# Patient Record
Sex: MALE | Race: WHITE | Employment: FULL TIME | URBAN - METROPOLITAN AREA
[De-identification: names, ages, dates, MRNs, and addresses within clinical notes are randomized per-mention and may not be internally consistent; named-entity substitution may affect disease eponyms.]

---

## 2017-06-04 ENCOUNTER — APPOINTMENT (OUTPATIENT)
Dept: CT IMAGING | Facility: CLINIC | Age: 37
End: 2017-06-04
Attending: EMERGENCY MEDICINE
Payer: COMMERCIAL

## 2017-06-04 ENCOUNTER — HOSPITAL ENCOUNTER (EMERGENCY)
Facility: CLINIC | Age: 37
Discharge: HOME | End: 2017-06-04
Attending: EMERGENCY MEDICINE | Admitting: EMERGENCY MEDICINE
Payer: COMMERCIAL

## 2017-06-04 VITALS
DIASTOLIC BLOOD PRESSURE: 65 MMHG | RESPIRATION RATE: 18 BRPM | WEIGHT: 285 LBS | SYSTOLIC BLOOD PRESSURE: 115 MMHG | HEART RATE: 102 BPM | BODY MASS INDEX: 36.57 KG/M2 | HEIGHT: 74 IN | OXYGEN SATURATION: 97 % | TEMPERATURE: 98.1 F

## 2017-06-04 DIAGNOSIS — G44.219 EPISODIC TENSION-TYPE HEADACHE, NOT INTRACTABLE: ICD-10-CM

## 2017-06-04 PROCEDURE — 72125 CT NECK SPINE W/O DYE: CPT

## 2017-06-04 PROCEDURE — 25000125 ZZHC RX 250: Performed by: EMERGENCY MEDICINE

## 2017-06-04 PROCEDURE — 99285 EMERGENCY DEPT VISIT HI MDM: CPT | Mod: 25 | Performed by: EMERGENCY MEDICINE

## 2017-06-04 PROCEDURE — 25000132 ZZH RX MED GY IP 250 OP 250 PS 637: Performed by: EMERGENCY MEDICINE

## 2017-06-04 PROCEDURE — 70450 CT HEAD/BRAIN W/O DYE: CPT

## 2017-06-04 PROCEDURE — 25000128 H RX IP 250 OP 636: Performed by: EMERGENCY MEDICINE

## 2017-06-04 PROCEDURE — 96374 THER/PROPH/DIAG INJ IV PUSH: CPT | Performed by: EMERGENCY MEDICINE

## 2017-06-04 PROCEDURE — 96361 HYDRATE IV INFUSION ADD-ON: CPT | Performed by: EMERGENCY MEDICINE

## 2017-06-04 PROCEDURE — 99284 EMERGENCY DEPT VISIT MOD MDM: CPT | Mod: Z6 | Performed by: EMERGENCY MEDICINE

## 2017-06-04 PROCEDURE — 96375 TX/PRO/DX INJ NEW DRUG ADDON: CPT | Performed by: EMERGENCY MEDICINE

## 2017-06-04 RX ORDER — DIAZEPAM 5 MG
5 TABLET ORAL ONCE
Status: COMPLETED | OUTPATIENT
Start: 2017-06-04 | End: 2017-06-04

## 2017-06-04 RX ORDER — SODIUM CHLORIDE 9 MG/ML
1000 INJECTION, SOLUTION INTRAVENOUS CONTINUOUS
Status: DISCONTINUED | OUTPATIENT
Start: 2017-06-04 | End: 2017-06-04 | Stop reason: HOSPADM

## 2017-06-04 RX ORDER — METOCLOPRAMIDE HYDROCHLORIDE 5 MG/ML
5 INJECTION INTRAMUSCULAR; INTRAVENOUS ONCE
Status: DISCONTINUED | OUTPATIENT
Start: 2017-06-04 | End: 2017-06-04 | Stop reason: HOSPADM

## 2017-06-04 RX ORDER — HYDROMORPHONE HYDROCHLORIDE 1 MG/ML
0.5 INJECTION, SOLUTION INTRAMUSCULAR; INTRAVENOUS; SUBCUTANEOUS
Status: DISCONTINUED | OUTPATIENT
Start: 2017-06-04 | End: 2017-06-04

## 2017-06-04 RX ORDER — ACETAMINOPHEN 325 MG/1
975 TABLET ORAL ONCE
Status: COMPLETED | OUTPATIENT
Start: 2017-06-04 | End: 2017-06-04

## 2017-06-04 RX ORDER — KETOROLAC TROMETHAMINE 30 MG/ML
30 INJECTION, SOLUTION INTRAMUSCULAR; INTRAVENOUS ONCE
Status: COMPLETED | OUTPATIENT
Start: 2017-06-04 | End: 2017-06-04

## 2017-06-04 RX ADMIN — VALPROATE SODIUM 500 MG: 100 INJECTION, SOLUTION INTRAVENOUS at 16:06

## 2017-06-04 RX ADMIN — SODIUM CHLORIDE 1000 ML: 9 INJECTION, SOLUTION INTRAVENOUS at 14:31

## 2017-06-04 RX ADMIN — ACETAMINOPHEN 975 MG: 325 TABLET, FILM COATED ORAL at 14:31

## 2017-06-04 RX ADMIN — SODIUM CHLORIDE 1000 ML: 9 INJECTION, SOLUTION INTRAVENOUS at 13:33

## 2017-06-04 RX ADMIN — KETOROLAC TROMETHAMINE 30 MG: 30 INJECTION, SOLUTION INTRAMUSCULAR at 13:36

## 2017-06-04 RX ADMIN — DIAZEPAM 5 MG: 5 TABLET ORAL at 14:31

## 2017-06-04 RX ADMIN — PROCHLORPERAZINE EDISYLATE 5 MG: 5 INJECTION INTRAMUSCULAR; INTRAVENOUS at 13:33

## 2017-06-04 ASSESSMENT — ENCOUNTER SYMPTOMS
RHINORRHEA: 0
NAUSEA: 1
WEAKNESS: 0
NUMBNESS: 0
PHOTOPHOBIA: 1
FEVER: 0
COUGH: 0
ABDOMINAL PAIN: 0
VOMITING: 0
HEADACHES: 1

## 2017-06-04 NOTE — ED PROVIDER NOTES
I was asked to take over the care of Mr. Pichardo from Dr. Ram at 330pm with plan for follow up on CT scan of the head and neck.  I have reviewed the Radiology reports with no sign of acute pathology.  The patient has had some improvement in symptoms of headache in the ED and has remained hemodynamically stable with no Neurologic deficit.  He has again stated other providers have given him Dilaudid for his headaches and I spoke at length why I do not believe narcotics are an optimal practice for treatment of his headaches.  He was dismissed to home in stable condition with recommendation to follow up with his PCP and Neurologist for assistance with long term headaches.     Natan Mosqueda MD  06/04/17 5060

## 2017-06-04 NOTE — ED PROVIDER NOTES
History     Chief Complaint   Patient presents with     Headache     Since Friday - Migraine     HPI  Dru Pichardo is a 36 year old male with a history of seizures, epilepsy, synostosis, headaches and Arnold-Chiari I malformation, status-post decompression (10/1/15) who presents with headache. The patient reports that he developed a headache on Friday, 2 days ago, after work that was gradual in onset and has continued to worsen since then. The headache is located in the occipital region radiating forward on his head. He complains of associated photophobia, blurred vision and nausea. He denies fever, double vision, vomiting, focal numbness or weakness, neck pain, rash or gait problem. He reports he did have a fall about 1 month ago and his headaches have been gradually worsening since then. He complains of neck stiffness.  He notes that he has had similar headaches in the past. At home, he has been taking Imitrex, Tylenol and ibuprofen without relief. He denies any cold or flu-like symptoms. No recent seizure activity.      Past Medical History:   Diagnosis Date     Arnold-Chiari malformation, type I (H) 8/3/2015     BMI 32.0-32.9,adult 9/24/2015     Deviated nasal septum 9/24/2015     Epilepsy (H)      History of frequent headaches      Seizures (H)      Snores 9/24/2015     Synostosis (cranial) 8/3/2015     Tobacco use 9/24/2015       Past Surgical History:   Procedure Laterality Date     DECOMPRESSION CHIARI N/A 10/1/2015    Procedure: DECOMPRESSION CHIARI;  Surgeon: Bethel Cunningham MD;  Location: [facility] OR     GI SURGERY      hemorrhoidectomy     HC TOOTH EXTRACTION W/FORCEP       ORTHOPEDIC SURGERY Right     shoulder       Family History   Problem Relation Age of Onset     DIABETES Father        Social History   Substance Use Topics     Smoking status: Former Smoker     Years: 14.00     Quit date: 12/25/2015     Smokeless tobacco: Not on file     Alcohol use No     Current Facility-Administered  "Medications   Medication     0.9% sodium chloride BOLUS    Followed by     0.9% sodium chloride infusion     prochlorperazine (COMPAZINE) injection 5 mg     ketorolac (TORADOL) injection 30 mg     Current Outpatient Prescriptions   Medication     SUMAtriptan Succinate (IMITREX PO)     carBAMazepine (TEGRETOL XR) 200 MG 12 hr tablet     ValACYclovir HCl (VALTREX PO)     clomiPHENE (CLOMID) 50 MG tablet        Allergies   Allergen Reactions     Cats      Seasonal Allergies       I have reviewed the Medications, Allergies, Past Medical and Surgical History, and Social History in the Epic system.    Review of Systems   Constitutional: Negative for fever.   HENT: Negative for congestion and rhinorrhea.    Eyes: Positive for photophobia and visual disturbance (burred).   Respiratory: Negative for cough.    Gastrointestinal: Positive for nausea. Negative for abdominal pain and vomiting.   Musculoskeletal: Negative for gait problem.   Skin: Negative for rash.   Neurological: Positive for headaches. Negative for weakness and numbness.   All other systems reviewed and are negative.      Physical Exam   BP: 137/87  Pulse: 102  Heart Rate: 102  Temp: 98.1  F (36.7  C)  Resp: 18  Height: 188 cm (6' 2\")  Weight: 129.3 kg (285 lb)  SpO2: 96 %  Physical Exam  General: patient is alert and oriented and in no acute distress   Head: atraumatic and normocephalic   EENT: moist mucus membranes without tonsillar erythema or exudates, pupils 3mm equal round and reactive, EOMI, no nystagmus  Neck: supple with full ROM, no meningismus  Cardiovascular: regular rate and rhythm, extremities warm and well perfused, no lower extremity edema  Pulmonary: lungs clear to auscultation bilaterally   Abdomen: soft, non-tender   Musculoskeletal: normal range of motion   Neurological: alert and oriented, moving all extremities symmetrically, CN II-XII intact, strength 5/5 and symmetric in , elbow flexion/extension, hip flexion/extension, knee " flexion/extension and ankle plantar/dorsiflexion, sensation to light touch in distal upper and lower extremities intact, normal finger to nose bilaterally  Skin: warm, dry, no rashes appreciated    ED Course     ED Course     Procedures     1:14 PM  The patient was seen and examined by Dr. Ram in Room 11.               Critical Care time:  none               Labs Ordered and Resulted from Time of ED Arrival Up to the Time of Departure from the ED - No data to display         Assessments & Plan (with Medical Decision Making)   Mr. Pichardo is a 36 year old male with a history of seizures, epilepsy, synostosis, headaches and Arnold-Chiari I malformation, status-post decompression (10/1/15) who presents with headache.  He is afebrile and does not have any infectious symptoms concerning for meningitis.  He denies any recent falls or trauma.  He reports relatively gradual onset of his symptoms without sudden onset or findings concerning for subarachnoid hemorrhage.  He does not have any focal deficits to suggest CVA.  He did have a fall about one month ago with worsening headaches and will obtain CT to rule out evidence of trauma or increased ICP.  He does report similar episodes of headache similar to his current episode.  He was given IVF, ketorolac and compazine with some improvement though still persistent.  Given acetaminophen, reglan and diazepam for neck stiffness and muscle tightness and continues to have ongoing pain.  Valproate ordered to try for next step of treatment.  Patient reports he usually gets dilaudid for his headache and that helps.  We did discuss that narcotics are not indicated for migraine treatment.  Patient signed out to evening provider pending CT.  Anticipate he will be discharge to home if imaging is unremarkable.      I have reviewed the nursing notes.    I have reviewed the findings, diagnosis, plan and need for follow up with the patient.    New Prescriptions    No medications on file        Final diagnoses:   Episodic tension-type headache, not intractable     I, Gil Abbasi, am serving as a trained medical scribe to document services personally performed by Jazlyn Ram MD, based on the provider's statements to me.   I, Jazlyn Ram MD, was physically present and have reviewed and verified the accuracy of this note documented by Gil Abbasi.     6/4/2017   [facility], [place], EMERGENCY DEPARTMENT     Jazlyn Ram MD  06/04/17 1534

## 2017-06-04 NOTE — ED AVS SNAPSHOT
[facility], Emergency Department    [address] [place] [address]    Phone:  [phone]                                       Dru Pichardo   MRN: [id number]    Department:  [facility], [place], Emergency Department   Date of Visit:  6/4/2017           Patient Information     Date Of Birth          1980        Your diagnoses for this visit were:     Episodic tension-type headache, not intractable        You were seen by Jazlyn Ram MD.      Follow-up Information     Follow up with Neurology, [facility].    Contact information:    [address]  [facility] [address]  [phone]          Discharge Instructions         Managing Tension-type Headache Symptoms  A tension-type headache can develop slowly. Being aware of the symptoms helps you recognize a headache early. Then you can act to reduce pain and relieve tension. Methods for relieving your symptoms include self-care and medicine.    Tension-type symptoms  The earlier you recognize the symptoms of a tension-type headache, the easier it is to treat. Tension-type headaches may:    Begin with fatigue, tension, or pain in the neck and shoulders    Feel like a band around the head    Be concentrated in the temple, the back of the head, behind the eyes, or in the face    Come and go, or last for days, weeks, or even longer    Involve referred pain -- this means that the area that hurts may not be where the problem begins  Self-care during a tension-type headache  When you have a tension-type headache, there are things you can do to relax, loosen muscles, and reduce the pain:    Brush your scalp lightly with a soft hairbrush.    Give yourself a massage. Knead the muscles running from your shoulders up the back of your skull. Or ask a friend to gently massage your neck and shoulders.    Use an ice pack. Wrap a thin cloth around a cold pack, a cold can of soda, or a bag of frozen vegetables. Apply this directly to the  place where you feel pain (such as your neck or temples).    Use moist heat to relax your muscles. Take a warm shower or bath. Or drape a warm, moist towel around your neck and shoulders.  Relieving pain and tension  Over-the-counter or prescription medicine can help relieve pain. Another way to reduce your pain is to use relaxation techniques to loosen tight muscles.  Medicine  Medicine used for tension-type headaches include the following:    NSAIDs (nonsteroidal anti-inflammatories), such as aspirin and ibuprofen, relieve inflammation and help block pain signals.    Acetaminophen treats pain, and some formulations contain caffeine.     Muscle relaxants can reduce painful muscle contractions.  Taking medicine safely  Be aware that:    Taking analgesics (pain relievers) or drinking too much coffee may lead to rebound headaches (frequent or severe headaches that can happen if you miss a dose of medication), so take pain medications only as needed. If you think you have these headaches, contact your health care provider.    Taking too much medication can cause sleep problems or stomach upset. Some over-the-counter headache medications may contain caffeine. These may disrupt sleep and worsen pain.  Relaxation techniques  A , class, book, or tape may help you learn these techniques. One or more of these methods may work for you:    Deep breathing. Slow, calm, deep breathing can help you relax. Breathe in for a count of 5 or more. Then slowly let the breath out.    Visualization. Imagining a peaceful, secure scene can give you a sense of control over your body and surroundings.    Progressive relaxation. This is done by tightening and then releasing muscle groups. Start at the top of your head and work your way down your body. Tighten each muscle group for 5 to 10 seconds. Then release the muscle group for the same amount of time.    Biofeedback. This is a type of training in which you learn to control certain  physical functions and responses. This helps you learn to reduce muscle tension.       4065-5060 [employer]. [address] [place] [address], [place] [address]. All rights reserved. This information is not intended as a substitute for professional medical care. Always follow your healthcare professional's instructions.          Preventing Migraine Headaches: Triggers  The first step in preventing migraines is to learn what triggers them. You may then be able to control your triggers to avoid or reduce the severity of your migraines.     Know your triggers  Be aware that you may have more than one trigger, and that some triggers may work together. Common migraine triggers include:    Food and nutrition. Skipping meals or not drinking enough water can trigger headaches. So can certain foods, such as caffeine, monosodium glutamate (MSG), aged cheese, or sausage.    Alcohol. Red wine and other alcoholic beverages are common migraine triggers.    Chemicals. Scents, cleaning products, gasoline, glue, perfume, and paint can be triggers. So can tobacco smoke, including secondhand smoke.    Emotions. Stress can trigger headaches or make them worse once they begin.    Sleep disruption. Staying up late, sleeping late, and traveling across time zones can disrupt your sleep cycle, triggering headaches.    Hormones. Many women notice that migraines tend to happen at a certain point in their menstrual cycle. Birth control pills or hormone replacement therapy may also trigger migraines.    Environment and weather. Air travel, changes in altitude, air pressure changes, hot sun, or bright or flashing lights can be triggers.    Control your triggers  These are some of the things you can do to try to control triggers:    Avoid triggers if you can. For example, stay clear of alcohol and foods that trigger your headaches. Use unscented household products. Keep regular sleep habits. Manage stress to help control emotional  triggers.    Change your behavior at times when triggers can't be avoided. For example, make sure to get enough rest and drink plenty of water while you're traveling. Make sure to carry a hat, sunglasses, and your medicines. Be alert for migraine symptoms, so you can treat a migraine early if it happens.    2076-1426 [employer]. [address] [place] [address], [place] [address]. All rights reserved. This information is not intended as a substitute for professional medical care. Always follow your healthcare professional's instructions.          24 Hour Appointment Hotline       To make an appointment at any [facility], call [phone] ([phone]). If you don't have a family doctor or clinic, we will help you find one. [facility] clinics are conveniently located to serve the needs of you and your family.             Review of your medicines      Our records show that you are taking the medicines listed below. If these are incorrect, please call your family doctor or clinic.        Dose / Directions Last dose taken    CLOMID 50 MG tablet   Dose:  25 mg   Generic drug:  clomiPHENE        Take 0.5 tablets (25 mg) by mouth daily   Refills:  0        IMITREX PO   Dose:  100 mg        Take 100 mg by mouth   Refills:  0        TEGRETOL  MG 12 hr tablet   Dose:  200 mg   Generic drug:  carBAMazepine        Take 1 tablet (200 mg) by mouth 2 times daily   Refills:  0        VALTREX PO   Dose:  500 mg   Indication:  Herpes Simplex Infection        Take 500 mg by mouth daily   Refills:  0                Procedures and tests performed during your visit     CT Cervical Spine w/o Contrast    CT Head w/o Contrast      Orders Needing Specimen Collection     None      Pending Results     No orders found from 6/2/2017 to 6/5/2017.            Pending Culture Results     No orders found from 6/2/2017 to 6/5/2017.            Pending Results Instructions     If you had any lab results that were not finalized at the  time of your Discharge, you can call the ED Lab Result RN at [phone]. You will be contacted by this team for any positive Lab results or changes in treatment. The nurses are available 7 days a week from 10A to 6:30P.  You can leave a message 24 hours per day and they will return your call.        Thank you for choosing [facility]       Thank you for choosing [facility] for your care. Our goal is always to provide you with excellent care. Hearing back from our patients is one way we can continue to improve our services. Please take a few minutes to complete the written survey that you may receive in the mail after you visit with us. Thank you!        [facility] [employer] Information     [employer] gives you secure access to your electronic health record. If you see a primary care provider, you can also send messages to your care team and make appointments. If you have questions, please call your primary care clinic.  If you do not have a primary care provider, please call [phone] and they will assist you.        Care EveryWhere ID     This is your Care EveryWhere ID. This could be used by other organizations to access your [facility] medical records  [id number]        After Visit Summary       This is your record. Keep this with you and show to your community pharmacist(s) and doctor(s) at your next visit.

## 2017-06-04 NOTE — ED AVS SNAPSHOT
[facility], [place], Emergency Department    [address] [place] [address]    Phone:  [phone]                                       Dru Pichardo   MRN: [id number]    Department:  [facility], Emergency Department   Date of Visit:  6/4/2017           After Visit Summary Signature Page     I have received my discharge instructions, and my questions have been answered. I have discussed any challenges I see with this plan with the nurse or doctor.    ..........................................................................................................................................  Patient/Patient Representative Signature      ..........................................................................................................................................  Patient Representative Print Name and Relationship to Patient    ..................................................               ................................................  Date                                            Time    ..........................................................................................................................................  Reviewed by Signature/Title    ...................................................              ..............................................  Date                                                            Time

## 2017-06-04 NOTE — DISCHARGE INSTRUCTIONS
Managing Tension-type Headache Symptoms  A tension-type headache can develop slowly. Being aware of the symptoms helps you recognize a headache early. Then you can act to reduce pain and relieve tension. Methods for relieving your symptoms include self-care and medicine.    Tension-type symptoms  The earlier you recognize the symptoms of a tension-type headache, the easier it is to treat. Tension-type headaches may:    Begin with fatigue, tension, or pain in the neck and shoulders    Feel like a band around the head    Be concentrated in the temple, the back of the head, behind the eyes, or in the face    Come and go, or last for days, weeks, or even longer    Involve referred pain -- this means that the area that hurts may not be where the problem begins  Self-care during a tension-type headache  When you have a tension-type headache, there are things you can do to relax, loosen muscles, and reduce the pain:    Brush your scalp lightly with a soft hairbrush.    Give yourself a massage. Knead the muscles running from your shoulders up the back of your skull. Or ask a friend to gently massage your neck and shoulders.    Use an ice pack. Wrap a thin cloth around a cold pack, a cold can of soda, or a bag of frozen vegetables. Apply this directly to the place where you feel pain (such as your neck or temples).    Use moist heat to relax your muscles. Take a warm shower or bath. Or drape a warm, moist towel around your neck and shoulders.  Relieving pain and tension  Over-the-counter or prescription medicine can help relieve pain. Another way to reduce your pain is to use relaxation techniques to loosen tight muscles.  Medicine  Medicine used for tension-type headaches include the following:    NSAIDs (nonsteroidal anti-inflammatories), such as aspirin and ibuprofen, relieve inflammation and help block pain signals.    Acetaminophen treats pain, and some formulations contain caffeine.     Muscle relaxants can reduce  painful muscle contractions.  Taking medicine safely  Be aware that:    Taking analgesics (pain relievers) or drinking too much coffee may lead to rebound headaches (frequent or severe headaches that can happen if you miss a dose of medication), so take pain medications only as needed. If you think you have these headaches, contact your health care provider.    Taking too much medication can cause sleep problems or stomach upset. Some over-the-counter headache medications may contain caffeine. These may disrupt sleep and worsen pain.  Relaxation techniques  A , class, book, or tape may help you learn these techniques. One or more of these methods may work for you:    Deep breathing. Slow, calm, deep breathing can help you relax. Breathe in for a count of 5 or more. Then slowly let the breath out.    Visualization. Imagining a peaceful, secure scene can give you a sense of control over your body and surroundings.    Progressive relaxation. This is done by tightening and then releasing muscle groups. Start at the top of your head and work your way down your body. Tighten each muscle group for 5 to 10 seconds. Then release the muscle group for the same amount of time.    Biofeedback. This is a type of training in which you learn to control certain physical functions and responses. This helps you learn to reduce muscle tension.       7599-8034 [employer]. [address] [place] [address], [place] [id number]. All rights reserved. This information is not intended as a substitute for professional medical care. Always follow your healthcare professional's instructions.          Preventing Migraine Headaches: Triggers  The first step in preventing migraines is to learn what triggers them. You may then be able to control your triggers to avoid or reduce the severity of your migraines.     Know your triggers  Be aware that you may have more than one trigger, and that some triggers may work together. Common migraine  triggers include:    Food and nutrition. Skipping meals or not drinking enough water can trigger headaches. So can certain foods, such as caffeine, monosodium glutamate (MSG), aged cheese, or sausage.    Alcohol. Red wine and other alcoholic beverages are common migraine triggers.    Chemicals. Scents, cleaning products, gasoline, glue, perfume, and paint can be triggers. So can tobacco smoke, including secondhand smoke.    Emotions. Stress can trigger headaches or make them worse once they begin.    Sleep disruption. Staying up late, sleeping late, and traveling across time zones can disrupt your sleep cycle, triggering headaches.    Hormones. Many women notice that migraines tend to happen at a certain point in their menstrual cycle. Birth control pills or hormone replacement therapy may also trigger migraines.    Environment and weather. Air travel, changes in altitude, air pressure changes, hot sun, or bright or flashing lights can be triggers.    Control your triggers  These are some of the things you can do to try to control triggers:    Avoid triggers if you can. For example, stay clear of alcohol and foods that trigger your headaches. Use unscented household products. Keep regular sleep habits. Manage stress to help control emotional triggers.    Change your behavior at times when triggers can't be avoided. For example, make sure to get enough rest and drink plenty of water while you're traveling. Make sure to carry a hat, sunglasses, and your medicines. Be alert for migraine symptoms, so you can treat a migraine early if it happens.    9378-4707 [employer]. [address] [place] [address], [place] [address]. All rights reserved. This information is not intended as a substitute for professional medical care. Always follow your healthcare professional's instructions.

## 2017-06-04 NOTE — ED NOTES
"ED TRIAGE    Medical / Trauma C/o:  36-yr male patient - presenting to ED for eval of Migraine HA-pain (global-HA pain); started Friday; has tried Imitrex x 3, without relief.  Patient has neck pain, ,photophobia, nausea, fatigue.  No recent trauma.  Airway patent., neuro intact, otherwise.    Duration of C/o:  3 days    Contributing Factors / Concerning HX:  Neurosurgery    Significant Med's / Tx's:  See med's    Febrile / Afebrile:  Afebrile    Patient Vitals for the past 24 hrs:   BP Temp Temp src Pulse Heart Rate Resp SpO2 Height Weight   06/04/17 1240 137/87 98.1  F (36.7  C) Oral 102 102 18 96 % 1.88 m (6' 2\") 129.3 kg (285 lb)       Brando Viera  June 4, 2017  12:42 PM    "

## 2019-11-04 ENCOUNTER — HEALTH MAINTENANCE LETTER (OUTPATIENT)
Age: 39
End: 2019-11-04

## 2020-10-23 ENCOUNTER — HOSPITAL ENCOUNTER (EMERGENCY)
Facility: CLINIC | Age: 40
Discharge: HOME | End: 2020-10-24
Attending: EMERGENCY MEDICINE | Admitting: EMERGENCY MEDICINE
Payer: COMMERCIAL

## 2020-10-23 DIAGNOSIS — M54.6 PAIN IN THORACIC SPINE: ICD-10-CM

## 2020-10-23 DIAGNOSIS — M54.41 CHRONIC MIDLINE LOW BACK PAIN WITH BILATERAL SCIATICA: ICD-10-CM

## 2020-10-23 DIAGNOSIS — G89.29 CHRONIC MIDLINE LOW BACK PAIN WITH BILATERAL SCIATICA: ICD-10-CM

## 2020-10-23 DIAGNOSIS — M54.42 CHRONIC MIDLINE LOW BACK PAIN WITH BILATERAL SCIATICA: ICD-10-CM

## 2020-10-23 PROCEDURE — 96376 TX/PRO/DX INJ SAME DRUG ADON: CPT

## 2020-10-23 PROCEDURE — 99285 EMERGENCY DEPT VISIT HI MDM: CPT | Performed by: EMERGENCY MEDICINE

## 2020-10-23 PROCEDURE — 96374 THER/PROPH/DIAG INJ IV PUSH: CPT

## 2020-10-23 PROCEDURE — 99285 EMERGENCY DEPT VISIT HI MDM: CPT

## 2020-10-23 RX ORDER — OXYCODONE HYDROCHLORIDE 15 MG/1
TABLET ORAL
COMMUNITY
Start: 2020-08-07

## 2020-10-23 RX ORDER — VILAZODONE HYDROCHLORIDE 40 MG/1
40 TABLET ORAL
COMMUNITY
Start: 2020-03-31

## 2020-10-23 RX ORDER — ALBUTEROL SULFATE 90 UG/1
2 AEROSOL, METERED RESPIRATORY (INHALATION)
COMMUNITY
Start: 2019-10-21

## 2020-10-23 RX ORDER — LIDOCAINE 50 MG/G
3 PATCH TOPICAL
COMMUNITY
Start: 2020-10-16

## 2020-10-23 RX ORDER — IBUPROFEN 800 MG/1
800 TABLET, FILM COATED ORAL
COMMUNITY
Start: 2020-10-16

## 2020-10-23 RX ORDER — PHENOL 1.4 %
10 AEROSOL, SPRAY (ML) MUCOUS MEMBRANE
COMMUNITY

## 2020-10-23 RX ORDER — CLONAZEPAM 1 MG/1
1 TABLET ORAL
COMMUNITY
Start: 2020-02-07

## 2020-10-23 RX ORDER — PREGABALIN 150 MG/1
CAPSULE ORAL
COMMUNITY
Start: 2020-01-29

## 2020-10-23 RX ORDER — PREGABALIN 300 MG/1
CAPSULE ORAL
COMMUNITY
Start: 2020-07-07

## 2020-10-23 NOTE — ED AVS SNAPSHOT
[facility] Emergency Department  [address]  [place] [place] [address]  Phone: [phone]  Fax: [phone]                                    Dru Pichardo   MRN: [id number]    Department: [facility] Emergency Department   Date of Visit: 10/23/2020           After Visit Summary Signature Page    I have received my discharge instructions, and my questions have been answered. I have discussed any challenges I see with this plan with the nurse or doctor.    ..........................................................................................................................................  Patient/Patient Representative Signature      ..........................................................................................................................................  Patient Representative Print Name and Relationship to Patient    ..................................................               ................................................  Date                                   Time    ..........................................................................................................................................  Reviewed by Signature/Title    ...................................................              ..............................................  Date                                               Time          22EPIC Rev 08/18

## 2020-10-24 ENCOUNTER — APPOINTMENT (OUTPATIENT)
Dept: GENERAL RADIOLOGY | Facility: CLINIC | Age: 40
End: 2020-10-24
Attending: EMERGENCY MEDICINE
Payer: COMMERCIAL

## 2020-10-24 VITALS
OXYGEN SATURATION: 100 % | DIASTOLIC BLOOD PRESSURE: 81 MMHG | HEART RATE: 79 BPM | RESPIRATION RATE: 16 BRPM | SYSTOLIC BLOOD PRESSURE: 127 MMHG | WEIGHT: 256 LBS | TEMPERATURE: 97.8 F | BODY MASS INDEX: 32.87 KG/M2

## 2020-10-24 PROCEDURE — 96374 THER/PROPH/DIAG INJ IV PUSH: CPT

## 2020-10-24 PROCEDURE — 96376 TX/PRO/DX INJ SAME DRUG ADON: CPT

## 2020-10-24 PROCEDURE — 250N000011 HC RX IP 250 OP 636: Performed by: EMERGENCY MEDICINE

## 2020-10-24 PROCEDURE — 72100 X-RAY EXAM L-S SPINE 2/3 VWS: CPT

## 2020-10-24 PROCEDURE — 72072 X-RAY EXAM THORAC SPINE 3VWS: CPT

## 2020-10-24 RX ORDER — HYDROMORPHONE HYDROCHLORIDE 1 MG/ML
0.5 INJECTION, SOLUTION INTRAMUSCULAR; INTRAVENOUS; SUBCUTANEOUS ONCE
Status: COMPLETED | OUTPATIENT
Start: 2020-10-24 | End: 2020-10-24

## 2020-10-24 RX ADMIN — HYDROMORPHONE HYDROCHLORIDE 1 MG: 1 INJECTION, SOLUTION INTRAMUSCULAR; INTRAVENOUS; SUBCUTANEOUS at 00:37

## 2020-10-24 RX ADMIN — HYDROMORPHONE HYDROCHLORIDE 0.5 MG: 1 INJECTION, SOLUTION INTRAMUSCULAR; INTRAVENOUS; SUBCUTANEOUS at 02:18

## 2020-10-24 ASSESSMENT — ENCOUNTER SYMPTOMS
SORE THROAT: 0
COUGH: 0
MYALGIAS: 0
DIARRHEA: 0
NAUSEA: 0
ABDOMINAL DISTENTION: 0
FREQUENCY: 0
PALPITATIONS: 0
SHORTNESS OF BREATH: 0
CHEST TIGHTNESS: 0
COLOR CHANGE: 0
EYE PAIN: 0
ABDOMINAL PAIN: 0
CHILLS: 0
VOMITING: 0
DYSURIA: 0
DIFFICULTY URINATING: 0
WEAKNESS: 0
NECK PAIN: 0
FEVER: 0
HEADACHES: 0
BACK PAIN: 1
DIZZINESS: 0
ARTHRALGIAS: 0
CONFUSION: 0
FATIGUE: 0
CONSTIPATION: 0

## 2020-10-24 NOTE — ED PROVIDER NOTES
"    [facility] EMERGENCY DEPARTMENT ([facility])   10/23/20      History     Chief Complaint   Patient presents with     Back Pain     pt has hx of back issues and fell bacwards onto some rocks bruising low back ( [place]) pt staes pain ongoing and wants it checked, has shooting pain in left leg and numbness i feet at times.     The history is provided by the patient and medical records.   Back Pain  Associated symptoms: no abdominal pain, no chest pain, no dysuria, no fever, no headaches and no weakness      Dru Pichardo is a 40 year old male with a past medical history significant for AURORA, low back pain, alcohol use disorder, epilepsy, radicular pain of sacrum, stenosis of lumbar spine, and chronic pain syndrome who is s/p revision laminectomy T7-T9, spinal cord stimulator removal on 7/27/20 and posterior lumbar interbody fusion L5-S1 prone on 12/14/18 who presents today Emergency Department for evaluation of ongoing back pain.    Per chart review, the patient was seen at [facility] on 10/15/2020 for pain in the right thorax, coccyx, and surgical bed after his thoracic laminectomy on 7/2020.  Patient underwent XR spine, ribs +chest, and sacrum+ coccyx with results posted below.    Today in the ED, patient endorses ongoing back pain near the thoracic midline, and sacral midline after a fall while he was hunting on 10/9.  He states he was attempting to climb over a log, and fell backwards onto a pile of rocks.  He states that since his fall, his pain has been getting worse.  He also endorses ongoing numbness into his left foot, but states this is a chronic issue since his spinal fusion.  Patient states that he does feel a \"pinching\" in his back that will occasionally make his left leg more numb than usual.  Denies any urinary, or stool incontinence.  Denies any numbness in the groin.    EXAM: XR SACRUM AND COCCYX MINIMUM 2 VWS-10/15/2020  IMPRESSION:   Negative sacrum and coccyx. " No evidence for fracture. Postoperative  changes of interbody fusion and posterior pedicular screw fixation L5-S1.    EXAM: XR RIBS RIGHT AND PA CHEST 3-10/15/2020   IMPRESSION:   Normal heart size and pulmonary vascularity. Lungs are clear. 2  views right RIBS negative. No rib fractures.    EXAM: XR SPINE THORACIC 3 VIEWS-10/15/2020  IMPRESSION:  Normal thoracic vertebral body heights and lateral alignment. Thoracic disc heights preserved. The previously noted spinal cord stimulator electrodes have been removed since 2020. Visualized lung fields grosslyclear.     I have reviewed the Medications, Allergies, Past Medical and Surgical History, and Social History in the V[facility] system.  PAST MEDICAL HISTORY:   Past Medical History:   Diagnosis Date     Arnold-Chiari malformation, type I (H) 8/3/2015     BMI 32.0-32.9,adult 2015     Deviated nasal septum 2015     Epilepsy (H)      History of frequent headaches      Seizures (H)      Snores 2015     Synostosis (cranial) 8/3/2015     Tobacco use 2015       PAST SURGICAL HISTORY:   Past Surgical History:   Procedure Laterality Date     DECOMPRESSION CHIARI N/A 10/1/2015    Procedure: DECOMPRESSION CHIARI;  Surgeon: Bethel Cunningham MD;  Location: [facility] OR     GI SURGERY      hemorrhoidectomy     HC TOOTH EXTRACTION W/FORCEP       ORTHOPEDIC SURGERY Right     shoulder       Past medical history, past surgical history, medications, and allergies were reviewed with the patient. Additional pertinent items: None    FAMILY HISTORY:   Family History   Problem Relation Age of Onset     Diabetes Father        SOCIAL HISTORY:   Social History     Tobacco Use     Smoking status: Former Smoker     Years: 14.00     Quit date: 2015     Years since quittin.8   Substance Use Topics     Alcohol use: No     Alcohol/week: 0.0 standard drinks     Social history was reviewed with the patient. Additional pertinent items: None      Discharge Medication List as  of 10/24/2020  2:23 AM      CONTINUE these medications which have NOT CHANGED    Details   carBAMazepine (TEGRETOL XR) 200 MG 12 hr tablet Take 1 tablet (200 mg) by mouth 2 times daily, Historical      clonazePAM (KLONOPIN) 1 MG tablet Take 1 mg by mouth, Historical      ibuprofen (ADVIL/MOTRIN) 800 MG tablet Take 800 mg by mouth, Historical      lidocaine (LIDODERM) 5 % patch Place 3 patches onto the skinHistorical      Melatonin 10 MG TABS tablet Take 10 mg by mouth, Historical      oxyCODONE IR (ROXICODONE) 15 MG tablet Take 1 tablet by mouth every 4 hours as needed for pain, may take one extra at bedtime. Max 7 tabs/day. use dates: 10/13/2020-11/02/2020, Historical      !! pregabalin (LYRICA) 150 MG capsule 3 month supply, One po bid, Historical      !! pregabalin (LYRICA) 300 MG capsule One po qhs, 3 month supply, Historical      tiZANidine (ZANAFLEX) 4 MG tablet Take 4 mg by mouth, Historical      ValACYclovir HCl (VALTREX PO) Take 500 mg by mouth daily, Historical      vilazodone (VIIBRYD) 40 MG TABS tablet Take 40 mg by mouth, Historical      albuterol (PROAIR HFA/PROVENTIL HFA/VENTOLIN HFA) 108 (90 Base) MCG/ACT inhaler Inhale 2 puffs into the lungs, HistoricalPharmacy may dispense brand covered by insurance (Proair, or proventil or ventolin or generic albuterol inhaler)       !! - Potential duplicate medications found. Please discuss with provider.             Allergies   Allergen Reactions     Cats      Seasonal Allergies         Review of Systems   Constitutional: Negative for chills, fatigue and fever.   HENT: Negative for congestion and sore throat.    Eyes: Negative for pain and visual disturbance.   Respiratory: Negative for cough, chest tightness and shortness of breath.    Cardiovascular: Negative for chest pain and palpitations.   Gastrointestinal: Negative for abdominal distention, abdominal pain, constipation, diarrhea, nausea and vomiting.   Genitourinary: Negative for difficulty urinating,  dysuria, frequency and urgency.   Musculoskeletal: Positive for back pain. Negative for arthralgias, myalgias and neck pain.   Skin: Negative for color change and rash.   Neurological: Negative for dizziness, weakness and headaches.   Psychiatric/Behavioral: Negative for confusion.     Physical Exam   BP: 131/86  Pulse: 88  Temp: 97.8  F (36.6  C)  Resp: 14  Weight: 116.1 kg (256 lb)  SpO2: 98 %      Physical Exam  Vitals signs and nursing note reviewed.   Constitutional:       General: He is not in acute distress.     Appearance: Normal appearance. He is not ill-appearing or toxic-appearing.   HENT:      Head: Normocephalic and atraumatic.      Nose: Nose normal.      Mouth/Throat:      Mouth: Mucous membranes are moist.   Eyes:      Pupils: Pupils are equal, round, and reactive to light.   Neck:      Musculoskeletal: Normal range of motion. No neck rigidity.   Cardiovascular:      Rate and Rhythm: Normal rate.      Pulses: Normal pulses.      Heart sounds: Normal heart sounds.   Pulmonary:      Effort: Pulmonary effort is normal. No respiratory distress.      Breath sounds: Normal breath sounds.   Abdominal:      General: Abdomen is flat. There is no distension.   Musculoskeletal: Normal range of motion.         General: No swelling, deformity or signs of injury.        Arms:       Right lower leg: No edema.      Left lower leg: No edema.   Skin:     General: Skin is warm.      Capillary Refill: Capillary refill takes less than 2 seconds.   Neurological:      Mental Status: He is alert and oriented to person, place, and time.      Sensory: No sensory deficit.      Motor: No weakness.      Coordination: Coordination normal.      Gait: Gait normal.      Deep Tendon Reflexes: Reflexes normal.      Comments: 5/5 muscle strength of the lower extremities.   Psychiatric:         Mood and Affect: Mood normal.         ED Course   12:20 AM  The patient was seen and examined by Natan Gomez DO in Room ED08.                Results for orders placed or performed during the hospital encounter of 10/23/20 (from the past 24 hour(s))   XR Lumbar Spine 2/3 Views    Narrative    EXAM: XR LUMBAR SPINE 2-3 VIEWS  LOCATION: [facility]  DATE/TIME: 10/24/2020 12:50 AM    INDICATION: Midline back pain.  COMPARISON: 11/18/2019.      Impression    IMPRESSION: There are 5 lumbar type vertebral bodies. There is good anatomic alignment of the lumbar spine and vertebral body heights are maintained veins. The patient is status post a anterior posterior spinal fusion at the L5-S1 disc space level along   with a laminectomy. The hardware is intact with no evidence of loosening. The disc space heights are fairly well-maintained throughout.   XR Thoracic Spine 3 Views    Narrative    EXAM: XR THORACIC SPINE 3 VW  LOCATION: [facility]  DATE/TIME: 10/24/2020 12:50 AM    INDICATION: Thoracic back pain  COMPARISON: 10/15/2020.      Impression    IMPRESSION: There is good anatomic alignment of the vertebral body heights and the disc space heights are well-maintained throughout. There are mild endplate changes in the mid to lower thoracic disc spaces. The lungs visualized on this study are clear.      Medications   HYDROmorphone (DILAUDID) injection 1 mg (1 mg Intravenous Given 10/24/20 0037)   HYDROmorphone (PF) (DILAUDID) injection 0.5 mg (0.5 mg Intravenous Given 10/24/20 0218)            Assessments & Plan (with Medical Decision Making)   Patient presents for exacerbation of chronic low back pain.  Has history of multiple laminectomies.    Differrential diagnosis includes hardware failure, paraspinal muscle strain, cauda equina, sciatica, herniated disc, fracture.    On arrival, patient mildly distressed due to the pain.  On physical exam, he has pain noted over the sites of previous laminectomy scars.  No obvious deformities.  His reflexes are intact.  No sensory deficit.  He is able to ambulate with a normal gait.  Has 5/ 5  motor strength of both lower extremities.  No bladder anesthesia, incontinence, or other signs of spinal cord issue.    Plan for pain control and basic x-rays.    Patient was given a total of 1.5 mg IV Dilaudid.  X-rays consistent with previous, no change or acute pathology.  On reassessment, patient does feel much better.  He will be discharged in good condition.  Will be referred back to pain management as well as his surgical team.      I have reviewed the nursing notes.    I have reviewed the findings, diagnosis, plan and need for follow up with the patient.    Discharge Medication List as of 10/24/2020  2:23 AM          Final diagnoses:   Chronic midline low back pain with bilateral sciatica   IWilian, am serving as a trained medical scribe to document services personally performed by Natan Gomez DO, based on the provider's statements to me.      Natan BARTLETT DO, was physically present and have reviewed and verified the accuracy of this note documented by Wilian Red.    10/23/2020   [facility] EMERGENCY DEPARTMENT     Natan Gomez DO  10/24/20 0241

## 2020-10-24 NOTE — DISCHARGE INSTRUCTIONS
Your x-rays are negative for any hardware failure or other new problems with your back.  Continue taking your home oxycodone and follow-up with your pain medication doctor and neurosurgery team as needed.  Return to the ED with any new or worsening symptoms.

## 2020-11-16 ENCOUNTER — HEALTH MAINTENANCE LETTER (OUTPATIENT)
Age: 40
End: 2020-11-16

## 2020-12-29 ENCOUNTER — HOSPITAL ENCOUNTER (EMERGENCY)
Facility: CLINIC | Age: 40
Discharge: LEFT BEFORE DISCHARGE | End: 2020-12-29
Attending: EMERGENCY MEDICINE
Payer: COMMERCIAL

## 2020-12-29 VITALS
HEIGHT: 74 IN | TEMPERATURE: 97.6 F | HEART RATE: 86 BPM | RESPIRATION RATE: 16 BRPM | SYSTOLIC BLOOD PRESSURE: 123 MMHG | DIASTOLIC BLOOD PRESSURE: 80 MMHG | BODY MASS INDEX: 32.87 KG/M2 | OXYGEN SATURATION: 96 %

## 2020-12-29 NOTE — ED TRIAGE NOTES
"Pt c/o back pain after having spinal surgery on 12/23/20. Pt also c/o \"bulging incisions.\" States that he was not discharged  With enough pain medications and a good plan. He was sent home with dilaudid and oxycodone.   "

## 2021-09-18 ENCOUNTER — HEALTH MAINTENANCE LETTER (OUTPATIENT)
Age: 41
End: 2021-09-18

## 2021-10-15 ENCOUNTER — MEDICAL CORRESPONDENCE (OUTPATIENT)
Dept: HEALTH INFORMATION MANAGEMENT | Facility: CLINIC | Age: 41
End: 2021-10-15
Payer: COMMERCIAL

## 2022-01-08 ENCOUNTER — HEALTH MAINTENANCE LETTER (OUTPATIENT)
Age: 42
End: 2022-01-08

## 2022-11-20 ENCOUNTER — HEALTH MAINTENANCE LETTER (OUTPATIENT)
Age: 42
End: 2022-11-20

## 2022-12-08 ENCOUNTER — TRANSFERRED RECORDS (OUTPATIENT)
Dept: HEALTH INFORMATION MANAGEMENT | Facility: CLINIC | Age: 42
End: 2022-12-08

## 2022-12-12 ENCOUNTER — TELEPHONE (OUTPATIENT)
Dept: BEHAVIORAL HEALTH | Facility: CLINIC | Age: 42
End: 2022-12-12

## 2022-12-12 NOTE — TELEPHONE ENCOUNTER
Pt called writer and explained he just got out of [facility] on Saturday, 12/10/22. He is currently using a pain pump with Dilaudid and takes opiate pain medications. Pt is interested in stopping the opiate pain meds and is open to having his pain pump turned off. Pt needs an assessment, writer gave pt intake phone number and transferred them to intake to schedule an evaluation.

## 2022-12-12 NOTE — TELEPHONE ENCOUNTER
Pt is a(n) adult (18+ out of HS) Seeking as eval for Adult WIL Assessment for evaluation and recommendations. and is interested in Adult WIL/Co-Occurring Program (Either In-Person or Virtual).  Appointment scheduled by:  Patient.  (If patient is self-pay, we much complete a Cost Estimate and save it to the pt chart)  Caller name:  Dru    Caller phone #: [phone]  If in person, please state reason why:  Caller reached out  Brief reason for appt:  Pt reqeust    Cost estimate not get completed.  Contact information verified/updated: not

## 2022-12-13 ENCOUNTER — HOSPITAL ENCOUNTER (OUTPATIENT)
Dept: BEHAVIORAL HEALTH | Facility: CLINIC | Age: 42
Discharge: HOME | End: 2022-12-13
Attending: FAMILY MEDICINE | Admitting: FAMILY MEDICINE
Payer: COMMERCIAL

## 2022-12-13 VITALS — BODY MASS INDEX: 24.38 KG/M2 | WEIGHT: 190 LBS | HEIGHT: 74 IN

## 2022-12-13 DIAGNOSIS — F11.20 OPIOID USE DISORDER, SEVERE, DEPENDENCE (H): ICD-10-CM

## 2022-12-13 PROBLEM — G89.4 CHRONIC PAIN DISORDER: Status: ACTIVE | Noted: 2020-02-03

## 2022-12-13 PROBLEM — F17.210 TOBACCO DEPENDENCE DUE TO CIGARETTES: Status: ACTIVE | Noted: 2022-09-24

## 2022-12-13 PROCEDURE — H0001 ALCOHOL AND/OR DRUG ASSESS: HCPCS | Mod: GT,95

## 2022-12-13 RX ORDER — MORPHINE SULFATE 30 MG/1
TABLET, FILM COATED, EXTENDED RELEASE ORAL
COMMUNITY
Start: 2022-11-17

## 2022-12-13 RX ORDER — HYDROMORPHONE HYDROCHLORIDE 4 MG/1
4 TABLET ORAL
COMMUNITY
Start: 2021-03-08

## 2022-12-13 ASSESSMENT — PATIENT HEALTH QUESTIONNAIRE - PHQ9: SUM OF ALL RESPONSES TO PHQ QUESTIONS 1-9: 16

## 2022-12-13 ASSESSMENT — COLUMBIA-SUICIDE SEVERITY RATING SCALE - C-SSRS
6. HAVE YOU EVER DONE ANYTHING, STARTED TO DO ANYTHING, OR PREPARED TO DO ANYTHING TO END YOUR LIFE?: NO
2. HAVE YOU ACTUALLY HAD ANY THOUGHTS OF KILLING YOURSELF?: NO
1. HAVE YOU WISHED YOU WERE DEAD OR WISHED YOU COULD GO TO SLEEP AND NOT WAKE UP?: NO
ATTEMPT LIFETIME: NO
TOTAL  NUMBER OF ABORTED OR SELF INTERRUPTED ATTEMPTS LIFETIME: NO
TOTAL  NUMBER OF INTERRUPTED ATTEMPTS LIFETIME: NO

## 2022-12-13 ASSESSMENT — ANXIETY QUESTIONNAIRES
6. BECOMING EASILY ANNOYED OR IRRITABLE: NEARLY EVERY DAY
GAD7 TOTAL SCORE: 17
3. WORRYING TOO MUCH ABOUT DIFFERENT THINGS: NEARLY EVERY DAY
GAD7 TOTAL SCORE: 17
7. FEELING AFRAID AS IF SOMETHING AWFUL MIGHT HAPPEN: NOT AT ALL
4. TROUBLE RELAXING: NEARLY EVERY DAY
1. FEELING NERVOUS, ANXIOUS, OR ON EDGE: MORE THAN HALF THE DAYS
5. BEING SO RESTLESS THAT IT IS HARD TO SIT STILL: NEARLY EVERY DAY
2. NOT BEING ABLE TO STOP OR CONTROL WORRYING: NEARLY EVERY DAY

## 2022-12-13 ASSESSMENT — PAIN SCALES - GENERAL: PAINLEVEL: MODERATE PAIN (4)

## 2022-12-13 NOTE — PROGRESS NOTES
[facility]  Provider Name:  DENIS Harrison/[facility]     Telephone: [phone]      PATIENT'S NAME: Dru Pichardo  PREFERRED NAME: Bong  PRONOUNS: he/him/his    MRN: [id number]  : 1980  ADDRESS:   [address] [place] [id number]  E-MAIL: [email or web address]   ACCT. NUMBER:  [id number]  DATE OF SERVICE: 2022  START TIME: 1:00 pm  END TIME: 2:45 pm  PREFERRED PHONE: [phone]   May we leave a program related message: Yes  SERVICE MODALITY:  Video Visit:        Provider verified identity through the following two step process.  Patient provided:  Patient  (1980) and Patient's last 4 digits of N ([phone])    Telemedicine Visit: The patient's condition can be safely assessed and treated via synchronous audio and visual telemedicine encounter.      Reason for Telemedicine Visit: Services only offered telehealth    Originating Site (Patient Location): Patient's home    Distant Site (Provider Location): Provider Remote Setting    Consent:  The patient/guardian has verbally consented to: the potential risks and benefits of telemedicine (video visit) versus in person care; bill my insurance or make self-payment for services provided; and responsibility for payment of non-covered services.     Patient would like the video invitation sent by:  My Chart    Mode of Communication:  Video Conference via [employer]well    EMERGENCY CONTACT:   Rashmi Pichardo (spouse)  Tel #: [phone]    Dr. FRYE ([facility] in [place], [place])  Tel #: [phone]  Fax #: [phone]    UNIVERSAL ADULT SUBSTANCE USE DISORDER DIAGNOSTIC ASSESSMENT    Identifying Information:  The patient is a 42 year old, /White male of [place] and [place] descent.  The patient was referred for an assessment by self.  The patient attended the session alone.     Chief Complaint:   The reason for seeking services at this time is:  The  patient reported the reason for participating in the substance use disorder assessment today on 12/13/2022 was due to the patient's own awareness he needed help, due to pressure from his wife and from his family members for him to get help and due to pressure from his employer for him to get help.  The patient reported having chronic pain issues as he has had 8 back surgeries for his degenerative disc disease and the patient reported he will likely have to have between 3-4 additional back surgeries prior to the age of 51.  The patient reported he was currently working with the [facility] in [place], [place] with Dr. FRYE and he reported being prescribed and taking 6 tablets of Oxycodone on a daily basis as prescribed, 3 tablets of Morphine on a daily basis as prescribed and having a pain medication pump which delivers Dilaudid to him as prescribed on a daily basis.  Despite being on the above prescribed opioid pain medications, the patient had started to abuse non-prescribed Fentanyl/opioid pain medications in 8/2022.  The patient reported his use of non-prescribed Fentanyl had escalated up to using between 25-50 tablets of non-prescribed Fentanyl/opioid pain medications on a daily basis for the past several months prior to going into the IP detoxification unit at [facility] on 12/6/2022.  The patient denied having any use of non-prescribed Fentanyl/opioid pain medications since 12/6/2022, but he had continued to take his other prescribed opioid pain medications on a daily basis as prescribed by the [facility] in [place], [place].  The patient reported he had been on a MLOA from his job since that IP detoxification admission on 12/6/2022 with the plan to complete a residential substance use disorder treatment program prior to being able to return to work.  The patient reported using his prescribed Klonopin as prescribed 2 times daily for the past 4-5 years.  The patient denied having any history of  abusing or overusing his prescribed Klonopin.  The patient was requesting a referral to enter the [facility] program at [facility] in [place], [place] for substance use disorder treatment.  The patient was informed patients are unable to take prescribed opioid pain medications or prescribed benzodiazepines while in the Lodging Plus treatment program at [facility] in [place], [place] without having a medical exception as facilitated by a physician to physician review or a RN to RN review.  This counselor reported he would likely need to consult with his pain management at the [facility] in [place], [place] about alternatives to taking his prescribed opioid pain medications, such as being put on Medication Assisted Therapy with Suboxone or similar.  This counselor acknowledged this may be a challenge for the patient given he reported inability to function if he was not taking his pain medications as prescribed and due to having a current pain medication pump delivering Dilaudid to the patient.  The patient's current pain medication prescriber Dr. FRYE at [facility] in [place], [place] has been put on a ENOC for a physician to physician review or a RN to RN review.  The patient first had a concern about having substance abuse issues at age 17.  The patient reported he had attempted to stop his use of opioids on his own in the past, but he had been unsuccessful in his attempts to maintain abstinence from opioids.  The patient denied having any history of participating in a substance use disorder treatment program.  The patient reported having 1 inpatient detoxification admission in 1999.  The patient is not currently receiving any substance use disorder treatment services.  The patient denied having any recent attendance at 12-step or other recovery support group meetings.  The patient does not appear to be in severe withdrawal, an imminent safety risk to self or others, or  requiring immediate medical attention and may proceed with the assessment interview.    Social/Family History:  The patient reported growing up in [place], [place].  The patient reported being raised by both of his biological parents in the same family home until age 18 when his parents /.  The patient denied experiencing or witnessing any verbal, physical or sexual abuse when he was growing up in the family home.  The patient reported overall his childhood was a combination of happy and challenging due to his parents providing foster care for between 6-10 adolescents in the family home who had a history of substance abuse problems.  The patient reported the foster care adolescents in the family home had exposed the patient to abusing alcohol and drugs at an early age.  The patient reported feeling supported by his mother, his father and all of his family members when he was growing up.  The patient reported being raised in the Jain Yarsanism (Christianity).  The patient described his current relationships with his family of origin as being good overall, but somewhat strained due to the patient's substance abuse.      The patient describes his cultural background as being a /White male of [place] and [place] descent.  Cultural influences and impact on patient's life structure, values, norms, and healthcare: The patient denied cultural concerns had an impact on life structure, values, norms, or healthcare.  Contextual influences on patient's health include: Family Factors: family history includes Anxiety Disorder in his sister and sister; Depression in his sister and sister; Diabetes in his father; Substance Abuse in his brother, brother, brother, father, mother, sister, sister, and sister. and Environmental Factors: The patient reported his parents provided foster care for between 6-10 adolescents in the family home who had a history of substance abuse problems.  The patient reported the  foster care adolescents in the family home had exposed the patient to abusing alcohol and drugs at an early age.  The patient identified his preferred language to be English.  The patient reported he does not need the assistance of an  or other support involved in therapy.  The patient reported he is not currently involved in community of ephraim activities.  The patient reported his spirituality would likely have no impact on his recovery.    The patient reported experiencing significant delays in developmental tasks, such as being diagnosed with ADHD as a child.  The patient's highest education level was some high school but no degree.  The patient identified the following learning problems: attention and concentration.  The patient reports he is able to understand written materials.    The patient reported the following relationship history: The patient reported being  2 times and he reported being  1 time.  The patient identified as being heterosexual and he reported being  to his wife for the past 10 years.  The patient reported having 2 daughters ages 5 and 2.     The patient's current living/housing situation involves staying in own home/apartment.  The patient reported living with his wife and their 2 daughters and he reported his housing is stable.  The patient denied having any concerns regarding his immediate living environment and/or neighborhood, but he had been unable to maintain abstinence from non-prescribed opioid medications while living there.  The patient identified his wife, his father, his mother and his siblings as being his primary support network at this time.  The patient identified the quality of his relationships with his support network as being good overall, but somewhat strained due to the patient's substance abuse.  The patient would like the following people involved in treatment services if recommended: None at this time.     The patient reported  engaging in the following recreational/leisure activities: spending time with his kids.  The patient reported engaging in the following recreation/leisure activities while using alcohol or other non-prescribed mood altering chemicals: The patient's use of Fentanyl/opioid pain medications had been done independently of his social/recreational/leisure activities.  The patient reported the following people are supportive of his recovery: his wife, his father, his mother and his siblings.  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  The patient reported his income is obtained through employment and spouse.  The patient reported having financial stressors at this time, including money being tight at this time, being behind on some of his bills and having some debt.  Cultural and socioeconomic factors do not affect the patient's access to services.    The patient reported the following substance related arrests or legal issues: The patient reported having between 1-2 small amount drug possession tickets for THC when he was a teenager and he reported having 1 DUI charge at age 21.  The patient denied having any other history of arrests or legal charges.  The patient denied being on court probation at this time.    Patient's Strengths and Limitations:  The patient identified the following strengths or resources that will help him succeed in treatment: family support and motivation.  Things that may interfere with the patient's success in treatment include: lack of a sober peer support network, financial stressors, physical health concerns, mental health concerns and socially isolated when using non-prescribed Fentanyl/opioid pain medications.     Assessments:  The following assessments were completed by patient for this visit:  PHQ9:   PHQ-9 SCORE 12/13/2022   PHQ-9  Total Score 16     GAD7:   AURORA-7 SCORE 12/13/2022   Total Score 17     PROMIS 10-Global Health (all questions and answers displayed):   PROMIS 10 12/13/2022   In general, would you say your health is: 2   In general, would you say your quality of life is: 1   In general, how would you rate your physical health? 2   In general, how would you rate your mental health, including your mood and your ability to think? 3   In general, how would you rate your satisfaction with your social activities and relationships? 1   In general, please rate how well you carry out your usual social activities and roles. (This includes activities at home, at work and in your community, and responsibilities as a parent, child, spouse, employee, friend, etc.) 2   To what extent are you able to carry out your everyday physical activities such as walking, climbing stairs, carrying groceries, or moving a chair? 3   In the past 7 days, how often have you been bothered by emotional problems such as feeling anxious, depressed, or irritable? 3   In the past 7 days, how would you rate your fatigue on average? 5   In the past 7 days, how would you rate your pain on average, where 0 means no pain, and 10 means worst imaginable pain? 4   Global Mental Health Score 8   Global Physical Health Score 9   PROMIS TOTAL - SUBSCORES 17   Some recent data might be hidden     [place] Suicide Severity Rating Scale (Lifetime/Recent)  [place] Suicide Severity Rating (Lifetime/Recent) 12/13/2022   1. Wish to be Dead (Lifetime) 0   2. Non-Specific Active Suicidal Thoughts (Lifetime) 0   Actual Attempt (Lifetime) 0   Has subject engaged in non-suicidal self-injurious behavior? (Lifetime) 0   Interrupted Attempts (Lifetime) 0   Aborted or Self-Interrupted Attempt (Lifetime) 0   Preparatory Acts or Behavior (Lifetime) 0   Calculated C-SSRS Risk Score (Lifetime/Recent) No Risk Indicated     GAIN-sliding scale:  When was the last time that you had significant  problems... 12/13/2022   with feeling very trapped, lonely, sad, blue, depressed or hopeless about the future? Past month   with sleep trouble, such as bad dreams, sleeping restlessly, or falling asleep during the day? Past Month   with feeling very anxious, nervous, tense, scared, panicked or like something bad was going to happen? Past month   with becoming very distressed & upset when something reminded you of the past? Past month   with thinking about ending your life or committing suicide? Never      When was the last time that you did the following things 2 or more times? 12/13/2022   Lied or conned to get things you wanted or to avoid having to do something? Past month   Had a hard time paying attention at school, work or home? Never   Had a hard time listening to instructions at school, work or home? Never   Were a bully or threatened other people? Never   Started physical fights with other people? 1+ years ago     Personal and Family Medical History:  The patient did report a family history of mental health concerns.  The patient reported family history includes Anxiety Disorder in his sister and sister; Depression in his sister and sister; Diabetes in his father; Substance Abuse in his brother, brother, brother, father, mother, sister, sister, and sister.    The patient reported the following previous mental health diagnoses: The patient reported a history of Depression NOS and Anxiety disorder NOS.  The patient reported his primary mental health symptoms include: depression, anxiety, sleep problems and symptoms related to past traumatic life events and these do not impact his ability to function.  The patient has received mental health services in the past: The patient reported taking his prescribed psychotropic medications as prescribed.  The patient denied having any history of working with a 1:1 mental health therapist.  Psychiatric Hospitalizations: None.  The patient denies a history of civil  commitment.  Current mental health services/providers include:  The patient reported taking his prescribed psychotropic medications as prescribed.  The patient denied having any history of working with a 1:1 mental health therapist.    The patient has had a physical exam to rule out medical causes for current symptoms.  Date of last physical exam was within the past year. The patient was encouraged to follow up with PCP if symptoms were to develop.  The patient has a [facility] Primary Care Provider, who is named Ghazala Godinez.  The patient reported the following medical concerns:   Past Medical History:   Diagnosis Date     Anxiety      Arnold-Chiari malformation, type I (H) 08/03/2015     BMI 32.0-32.9,adult 09/24/2015     DDD (degenerative disc disease), cervical      Depressive disorder      Deviated nasal septum 09/24/2015     Epilepsy (H)      History of frequent headaches      Seasonal allergic rhinitis      Seizures (H)      Snores 09/24/2015     Synostosis (cranial) 08/03/2015     Tobacco use 09/24/2015   The patient reported taking his medications as prescribed and following the recommendations of his healthcare providers.  The patient reported pain concerns including having significant chronic pain issues and he reported currently working with the [facility] in [place], [place] for pain management.  The patient did not feel there was any need for additional help addressing this pain concerns.  The patient is male and is not pregnant.  There are not significant appetite / nutritional concerns / weight changes.  The patient does not report having a history of an eating disorder.  The patient does report a history of head injury / trauma / cognitive impairment.  The patient reported having around more than 10 concussions in his life mainly related to playing sports and being in accidents.    The patient reported current medications as:   Outpatient Medications Marked as Taking for the  12/13/22 encounter (Hospital Encounter) with Ameya [facility]   Medication Sig     albuterol (PROAIR HFA/PROVENTIL HFA/VENTOLIN HFA) 108 (90 Base) MCG/ACT inhaler Inhale 2 puffs into the lungs     carBAMazepine (TEGRETOL XR) 200 MG 12 hr tablet Take 1 tablet (200 mg) by mouth 2 times daily     clonazePAM (KLONOPIN) 1 MG tablet Take 1 mg by mouth     HYDROmorphone (DILAUDID) 4 MG tablet Take 4 mg by mouth     ibuprofen (ADVIL/MOTRIN) 800 MG tablet Take 800 mg by mouth     lidocaine (LIDODERM) 5 % patch Place 3 patches onto the skin     morphine (MS CONTIN) 30 MG CR tablet      oxyCODONE IR (ROXICODONE) 15 MG tablet Take 1 tablet by mouth every 4 hours as needed for pain, may take one extra at bedtime. Max 7 tabs/day. use dates: 10/13/2020-11/02/2020     pregabalin (LYRICA) 150 MG capsule 3 month supply, One po bid     pregabalin (LYRICA) 300 MG capsule One po qhs, 3 month supply     tiZANidine (ZANAFLEX) 4 MG tablet Take 4 mg by mouth     ValACYclovir HCl (VALTREX PO) Take 500 mg by mouth daily     vilazodone (VIIBRYD) 40 MG TABS tablet Take 40 mg by mouth     Medication Adherence:  The patient reported taking his prescribed medications as prescribed.  The patient reported being able  to self-administer his medications.    Patient Allergies:    Allergies   Allergen Reactions     Cats      Seasonal Allergies      Medical History:    Past Medical History:   Diagnosis Date     Anxiety      Arnold-Chiari malformation, type I (H) 08/03/2015     BMI 32.0-32.9,adult 09/24/2015     DDD (degenerative disc disease), cervical      Depressive disorder      Deviated nasal septum 09/24/2015     Epilepsy (H)      History of frequent headaches      Seasonal allergic rhinitis      Seizures (H)      Snores 09/24/2015     Synostosis (cranial) 08/03/2015     Tobacco use 09/24/2015     Substance Use:  The patient reported the following biological family members or relatives with chemical health issues: family history includes  Anxiety Disorder in his sister and sister; Depression in his sister and sister; Diabetes in his father; Substance Abuse in his brother, brother, brother, father, mother, sister, sister, and sister.  The patient denied having any history of participating in a substance use disorder treatment program.  The patient reported having 1 inpatient detoxification admission in 1999.  The patient is not currently receiving any substance use disorder treatment services.  The patient denied having any recent attendance at 12-step or other recovery support group meetings.         Substance Age of first use Pattern and duration of use (include amounts and frequency) Date of last use     Withdrawal potential Route of use   Has used Alcohol 15 The patient reported he had never been a heavy drinker of alcohol and on average he would drink alcohol between 3-4 times per year.   1+ year ago No Oral   Has used Marijuana   17 The patient reported his heaviest use of THC/cannabis had been between the ages 19 and 22, when he reported a pattern of smoking between 1-2 joints of THC/cannabis between 3-4 times per week.   26 No Smoke   Has used Amphetamines   18 The patient reported his heaviest use of methamphetamine had been between the ages of 18 and 20, when he reported a pattern of smoking or snorting a 1/2 gram up to 3 grams of methamphetamine on an almost daily basis.   Late 20's No Smoke and snorted   Has used Cocaine/crack    19 The patient reported his heaviest use of powder cocaine had been between the ages of 22 and 26, when he reported a pattern of snorting between 1 gram up to an 8-ball of powder cocaine between 2-3 times per week.   8-years No Snort   Has used Hallucinogens 18 LSD: between 8-10 times in his life.    Psilocybin mushrooms: between 8-10 times in his life.   Early 30's No Oral   Has not used Inhalants        Has not used Heroin        Has used Other Opiates Mid  30's The patient reported his heaviest use of  non-prescribed Fentanyl/opioid pain medications had been from 8/2022 until 12/6/2022, when he reported his use of non-prescribed Fentanyl/opioid pain medications had escalated up to using between 25-50 tablets of non-prescribed Fentanyl/opioid pain medications on a daily basis for the past several months prior to going into the IP detoxification unit at [facility] on 12/6/2022.    In addition to using between 25-50 tablets of non-prescribed Fentanyl/opioid pain medications, he had been taking 6 tablets of Oxycodone on a daily basis as prescribed, 3 tablets of Morphine on a daily basis as prescribed and having a pain medication pump which delivers Dilaudid to him as prescribed on a daily basis by the [facility] in [place], [place].     The patient reported he had been using prescribed opioid pain medications mostly as prescribed over the past 3-4 years.   12/6/2022    (20+ tablets of non-RX'd Fentanyl - opioid pain medications) Yes Oral and pump   Has used Benzodiazepines   Early 30's The patient reported using his prescribed Klonopin as prescribed 2 times daily for the past 4-5 years.  The patient denied having any history of abusing or overusing is prescribed Klonopin.   12/13/2022 Yes Oral   Has not used Barbiturates        Has not used Over the counter medications        Has used Nicotine 17 The patient reported a current pattern of smoking a pack of cigarettes on a daily basis.    12/13/2022 Yes  Smoked    Has use Caffeine 10 The patient reported a current pattern of drinking 3-4 cans of an Energy drink on a daily basis.    12/13/2022 Yes  Oral   Has not used other substances not listed above:  Identify:           The patient reported the following problems as a result of their substance use: relationship problems, family problems, chronic health problems which were exacerbated by his use of Fentanyl/opioid pain medications, occupational / vocational problems and financial problems.  The patient is  concerned about substance use.  The patient reported his recovery goal is: The patient's plan and goal is to abstain from non-prescribed opioids/Fentanyl and from all other non-prescribed mood altering chemicals.     The patient reports experiencing the following withdrawal symptoms within the past 12 months: sweating, shaky/jittery/tremors, unable to sleep, agitation, headache, fatigue, sad/depressed feeling, muscle aches, vivid/unpleasant dreams, irritability, sensitivity to noise, nausea/vomiting, dizziness, diarrhea, diminished appetite, unable to eat and anxiety/worry and the following within the past 30 days: sweating, shaky/jittery/tremors, unable to sleep, agitation, headache, fatigue, sad/depressed feeling, muscle aches, vivid/unpleasant dreams, irritability, sensitivity to noise, nausea/vomiting, dizziness, diarrhea, diminished appetite, unable to eat and anxiety/worry. (DSM-11)  The patient reported having urges to use opioids/Fentanyl and nicotine.  (DSM-4)  The patient reported he has used more opioids/Fentanyl than intended and over a longer period of time than intended.  (DSM-1)  The patient reported he has had unsuccessful attempts to cut down or control use of opioids/Fentanyl and nicotine.  (DSM-2)  The patient reported his longest period of abstinence from non-prescribed mood altering chemicals had been for around 3-4 years prior to starting to abuse non-prescribed non-prescribed Fentanyl opioid pain medications in 8/2022.  The patient denied having any use of non-prescribed Fentanyl opioid pain medications since his IP detoxification on the [facility] on 12/6/2022.  The patient reported he has needed to use more opioids/Fentanyl and nicotine to achieve the same effect.  (DSM-10)  The patient does report diminished effect with use of same amount of opioids/Fentanyl and nicotine.  (DSM-10)     The patient does report a great deal of time is spent in activities necessary to obtain,  use, or recover from opioids/Fentanyl effects.  (DSM-3)  The patient does report important social, occupational, or recreational activities are given up or reduced because of opioids/Fentanyl use.  (DSM-7)  Opioids/Fentanyl use is continued despite knowledge of having a persistent or recurrent physical or psychological problem that is likely to have been caused or exacerbated by use.  (DSM-9)  The patient reported the following problem behaviors while under the influence of substances: The patient reported having relationship conflict with his wife, being more impulsive, being more socially isolated, having blackouts and having some memory impairment when under the influence of opioids/Fentanyl.  (DSM-6)  The patient reported recurrent use of opioids/Fentanyl in physically hazardous such as driving a motor vehicle and having blackouts while under the influence.  (DSM-8)    The patient reported his substance use has not negatively impacted his ability to function in a school setting within the past year.  (DSM-5)  The patient reported his substance use has negatively impacted his ability to function in a work setting.  The patient reported having decreased performance at work due to his substance use.  (DSM-5)  The patient's demographics and history impact his recovery in the following ways: Family Factors: family history includes Anxiety Disorder in his sister and sister; Depression in his sister and sister; Diabetes in his father; Substance Abuse in his brother, brother, brother, father, mother, sister, sister, and sister. and Environmental Factors: The patient reported his parents provided foster care for between 6-10 adolescents in the family home who had a history of substance abuse problems.  The patient reported the foster care adolescents in the family home had exposed the patient to abusing alcohol and drugs at an early age.  The patient reported engaging in the following recreation/leisure activities while  using alcohol or other non-prescribed mood altering chemicals: The patient's use of non-prescribed Fentanyl/opioid pain medications had been done independently of his social/recreational/leisure activities.  The patient reported the following people are supportive of his recovery: his father, his mother and his siblings.    The patient denied having current or past concerns regarding gambling and denied ever participating in a gambling treatment program.  The patient does not have other addictive behaviors he is concerned about at this time.    Dimension Scale Ratings:    Dimension 1 -  Acute Intoxication/Withdrawal: 1 - Minor Problem    Dimension 2 - Biomedical: 2 - Moderate Problem    Dimension 3 - Emotional/Behavioral/Cognitive Conditions: 2 - Moderate Problem    Dimension 4 - Readiness to Change:  2 - Moderate Problem    Dimension 5 - Relapse/Continued Use/ Continued Problem Potential: 4 - Extreme Problem    Dimension 6 - Recovery Environment:  3 - Severe Problem    Significant Losses / Trauma / Abuse / Neglect Issues:   The patient did not serve in the .  There are indications or report of significant loss, trauma, abuse or neglect issues related to: The patient denied having any history of being verbally, emotionally, physically, or sexually abused.  The patient reported having a history of trauma issues due to an employer dying on a job site from a heart attack in 2018.  The patient denied having any history of suicide attempts and denied having any current suicide ideation.  The patient denied having any history of self-injurious behavior.   Concerns for possible neglect are not present.    Safety Assessment:   The patient denies current homicidal ideation and behaviors.  The patient denies current self-injurious ideation and behaviors.    The patient reported unsafe motor vehicle operation, reported having blackouts and reported having memory impairment associated with substance use.  The patient  reported substance use associated with mental health symptoms.  The patient reported the following current concerns for their personal safety: None.  The patient reported there are firearms in the home. The firearms are secured in a locked space.     History of Safety Concerns:  The patient denied a history of homicidal ideation.     The patient denied a history of personal safety concerns.    The patient denied a history of assaultive behaviors.    The patient denied having any history of sexual assault behaviors.  The patient denied having any history of being registered as a sex offender.    The patient reported a history of unsafe motor vehicle operation, reported a history of having blackouts and reported a history of having memory impairment associated with substance use.  The patient reported a history of substance use associated with mental health symptoms.  The patient reported the following protective factors: positive relationships positive family connections, forward/future oriented thinking, adherence with prescribed medication, living with other people and daily obligations.    Risk Plan:  See Recommendations for Safety and Risk Management Plan    Review of Symptoms per patient report:  Substance Use:  blackouts, daily use, substance related decrease in work performance, family relationship problems due to substance use, social problems related to substance use, driving under the influence and cravings/urges to use.     Diagnostic Criteria:   1.)  Substance is often taken in larger amounts or over a longer period than was intended.  Met for:  opioids/Fentanyl.  2.)  There is persistent desire or unsuccessful efforts to cut down or control use of the substance.  Met for:  opioids/Fentanyl and nicotine.  3.)  A great deal of time is spent in activities necessary to obtain the substance, use the substance, or recover from its effects.  Met for:  opioids/Fentanyl.  4.)  Craving, or a strong desire or urge  to use the substance.  Met for:  opioids/Fentanyl and nicotine.  5.)  Recurrent use of the substance resulting in a failure to fulfill major role obligations at work, school, or home.  Met for:  opioids/Fentanyl.  6.)  Continued use of the substance despite having persistent or recurrent social or interpersonal problems caused or exacerbated by the effects of its use.  Met for:  opioids/Fentanyl.  7.)  Important social, occupational, or recreational activities are given up or reduced because of the substance.  Met for:  opioids/Fentanyl.  8.)  Recurrent use of the substance in which it is physically hazardous.  Met for:  opioids/Fentanyl.  9.)  Use of the substance is continued despite knowledge of having a persistent or recurrent physical or psychological problem that is likely to have been cause or exacerbated by the substance.  Met for:  opioids/Fentanyl.  10.)  Tolerance:  either a need for markedly increased amounts of the substance to achieve the desired effect or a markedly diminished effect with continued use of the same amount of the substance.  Met for:  opioids/Fentanyl and nicotine.  11.)  Withdrawal:  either patient endorses characteristic withdrawal syndrome for the substance or the substance (or closely related substance) is taken to relieve or avoid withdrawal symptoms.  Met for:  opioids/Fentanyl and nicotine.    Collateral Contact Summary:   Collateral contacts contributing to this assessment:  The patient's electronic medical records were reviewed at time of assessment.    No additional collateral data had been obtained at the time of this documentation.     If court related records were reviewed, summarize here: None    Information from collateral contacts supported/largely agreed with information from the client and associated risk ratings.    Information in this assessment was obtained from the medical record and provided by the patient who is a good historian.        The patient will have open  access to his substance use disorder assessment medical record.    As evidenced by self report and criteria, the patient meets the following DSM-5 Diagnoses: (Sustained by DSM-5 Criteria Listed Above)      1.)  Opioid Use Disorder Severe - 304.00 (F11.20)  2.)  Tobacco Use Disorder Moderate - 305.10 (F17.200)  3.)  Depression NOS, per patient self-report  4.)  Anxiety disorder NOS, per patient self-report    Specify if: In early remission:  After full criteria for alcohol/drug use disorder were previously met, none of the criteria for alcohol/drug use disorder have been met for at least 3 months but for less than 12 months (with the exception that Criterion A4,  Craving or a strong desire or urge to use alcohol/drug  may be met).     In sustained remission:   After full criteria for alcohol use disorder were previously met, none of the criteria for alcohol/drug use disorder have been met at any time during a period of 12 months or longer (with the exception that Criterion A4,  Craving or strong desire or urge to use alcohol/drug  may be met).     Specify if:   This additional specifier is used if the individual is in an environment where access to alcohol is restricted.    Mild: Presence of 2-3 symptoms  Moderate: Presence of 4-5 symptoms  Severe: Presence of 6 or more symptoms    Recommendations:     1. Plan for Safety and Risk Management:     It was recommended the patient call 911 or go to the local Emergency Department should there be any significant change in the above risk factors.            Report to child / adult protection services was NA.    2. WIL Referrals:      Recommendations:      1.)  The patient was informed patients are unable to take prescribed opioid pain medications or prescribed benzodiazepines while in the [facility] treatment program at [facility] in [place], [place] without having a medical exception as facilitated by a physician to physician review or a RN to RN review.  This  counselor reported he would likely need to consult with his pain management at the [facility] in [place], [place] about alternatives to taking his prescribed opioid pain medications, such as being put on Medication Assisted Therapy with Suboxone or similar.  2.)  Abstain from Fentanyl/opioid pain medications and from all other non-prescribed mood altering chemicals.   3.)  Have a mental health evaluation to address his current clinical mental health issues while on a residential or board and lodging substance use disorder treatment program unit.  4.)  Follow all of the recommendations of his medical and mental health providers.  5.)  Follow all of the terms and conditions of his current pain management providers at the [facility] at [place], [place].  6.)  Enter the [facility] program at [facility] in [place], [place] or a similar residential or board and lodging treatment program for substance use disorder treatment.  7.)  Follow all of the recommendations of his substance use disorder treatment providers including entering an extended care program as needed.    The patient reported he was willing to follow the above recommendations.      The patient would like the following family or other support people involved in their treatment:  None at this time.  The patient has a history of opioid abuse and was given treatment options, including Medication Assisted Treatment and information on the risks of opioid use disorder including recognizing and responding to opioid overdose.  The patient is not currently receiving any substance use disorder treatment services.    The patient was provided with information on the [facility], which is a walk-in clinic able to provide patient's with Medication Assisted Therapy with Suboxone or a similar medication to treat withdrawal symptoms from opiates/heroin and/or as a maintenance medication.      [facility]  [address], [address]   [place], [place], [address]  Phone: [phone]  Fax: [phone]    Open Monday-Friday  9:00am-4:00pm  Walk in hours: 9am-3pm      3.  Mental Health Referrals:     The patient would benefit from having a mental health evaluation to address his current mental health issues while on the residential or board and lodging treatment program unit.    4. Cultural Concerns:    The patient did not identify having any cultural concerns regarding mental health, physical health, or substance use issues.     5. Recommendations for treatment focus:      Alcohol / Substance Use - See #2. WIL Referrals above for details on recommendations.    Clinical Substantiation for the above recommendations: The patient appears to have a significant risk of developing serious withdrawal symptoms from his prescribed opioid pain medications and he may benefit from Medication Assisted Therapy with Suboxone or a similar Medication Assisted Therapy medication, he lacks long-term sober maintenance skills, he lacks sober coping skills, he lacks awareness regarding the disease model of addiction, he lacks a sober peer support network, has dual issues of mental health and substance abuse, he has medical issues which are exacerbated by substance abuse and he has mental health symptoms which are exacerbated by substance abuse.    Provider Name/ Credentials:  JILL Harrison  December 13, 2022

## 2023-04-15 ENCOUNTER — HEALTH MAINTENANCE LETTER (OUTPATIENT)
Age: 43
End: 2023-04-15

## 2024-06-16 ENCOUNTER — HEALTH MAINTENANCE LETTER (OUTPATIENT)
Age: 44
End: 2024-06-16

## 2025-06-21 ENCOUNTER — HEALTH MAINTENANCE LETTER (OUTPATIENT)
Age: 45
End: 2025-06-21